# Patient Record
Sex: FEMALE | Race: WHITE | NOT HISPANIC OR LATINO | Employment: OTHER | URBAN - METROPOLITAN AREA
[De-identification: names, ages, dates, MRNs, and addresses within clinical notes are randomized per-mention and may not be internally consistent; named-entity substitution may affect disease eponyms.]

---

## 2019-05-31 ENCOUNTER — TELEPHONE (OUTPATIENT)
Dept: SCHEDULING | Facility: CLINIC | Age: 69
End: 2019-05-31

## 2019-05-31 DIAGNOSIS — E78.5 DYSLIPIDEMIA: Primary | ICD-10-CM

## 2019-12-03 ENCOUNTER — OFFICE VISIT (OUTPATIENT)
Dept: CARDIOLOGY | Facility: CLINIC | Age: 69
End: 2019-12-03
Payer: MEDICARE

## 2019-12-03 VITALS
WEIGHT: 136 LBS | DIASTOLIC BLOOD PRESSURE: 78 MMHG | SYSTOLIC BLOOD PRESSURE: 128 MMHG | HEIGHT: 63 IN | BODY MASS INDEX: 24.1 KG/M2

## 2019-12-03 DIAGNOSIS — E78.5 HYPERLIPIDEMIA, UNSPECIFIED HYPERLIPIDEMIA TYPE: Primary | ICD-10-CM

## 2019-12-03 PROCEDURE — 93000 ELECTROCARDIOGRAM COMPLETE: CPT | Performed by: INTERNAL MEDICINE

## 2019-12-03 PROCEDURE — 99214 OFFICE O/P EST MOD 30 MIN: CPT | Performed by: INTERNAL MEDICINE

## 2019-12-03 RX ORDER — CALCIUM CARBONATE 500(1250)
TABLET ORAL
COMMUNITY
Start: 2016-08-29

## 2019-12-03 RX ORDER — CICLOPIROX 80 MG/ML
SOLUTION TOPICAL
COMMUNITY
Start: 2011-12-13

## 2019-12-03 RX ORDER — DESOXIMETASONE 2.5 MG/G
CREAM TOPICAL
COMMUNITY
Start: 2008-10-30

## 2019-12-03 RX ORDER — SUMATRIPTAN SUCCINATE 100 MG/1
TABLET ORAL
COMMUNITY

## 2019-12-03 RX ORDER — IBUPROFEN 600 MG/1
TABLET ORAL
COMMUNITY
Start: 2016-12-13

## 2019-12-03 RX ORDER — ACETAMINOPHEN 500 MG
TABLET ORAL
COMMUNITY
Start: 2016-08-29

## 2019-12-03 RX ORDER — VITAMIN B COMPLEX
1 CAPSULE ORAL DAILY
COMMUNITY

## 2019-12-03 NOTE — LETTER
December 3, 2019     Chelsie Bartlett MD  [address] [address]    Patient: Brandee Raymundo  YOB: 1950  Date of Visit: 12/3/2019      Dear Dr. Bartlett:    Thank you for referring Brandee Raymundo to me for evaluation. Below are my notes for this consultation.    If you have questions, please do not hesitate to call me. I look forward to following your patient along with you.         Sincerely,        Carlos Enrique Dow MD        CC: No Recipients  Carlos Enrique Dow MD  12/3/2019  4:14 PM  Sign at close encounter    12/3/2019    Chelsie Bartlett M.D.    BRANDEE RAYMUNDO  : 1950    Dear Dr. Bartlett:    It was my pleasure to see your patient, Brandee Raymundo, in [facility]. As you know, she carries a history of dyslipidemia characterized as an elevated HDL cholesterol.      We reviewed her previous cardiovascular workup. As you know, she underwent a coronary calcium score in . Her calcium score was 0. There is no family history of premature coronary artery disease. She denies smoking, hypertension or obesity. She follows a low-carbohydrate, low-saturated fat Mediterranean Diet.    We reviewed her advanced lipid panel in detail. Her total cholesterol is 220, , HDL 88, triglycerides 57. Her LP(a) is borderline at 121. 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 Fortunately, her homocystine levels are normal. There is no evidence of insulin resistance or prediabetes and her omega-3 index is excellent at 8.2.    Today we reviewed her most recent lipid panel.  Total cholesterol 241   triglycerides 61.  Certainly an unremarkable lipid panel on lifestyle management.  We are recommending that she undergo repeat coronary calcium score.  If her calcium score remains 0 she will continue with lifestyle management.    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  ·   PAST SURGICAL HISTORY:   Rotator cuff, bunion, appendectomy.    CURRENT MEDICATIONS:   Current Outpatient Medications:   •  ALPHA LIPOIC ACID ORAL, Take by mouth daily., Disp: , Rfl:   •  b complex vitamins capsule, Take 1 capsule by mouth daily., Disp: , Rfl:   •  calcium carbonate (CALCIUM 500) 500 mg calcium (1,250 mg) tablet, take 4 tablet by oral route  every day, Disp: , Rfl:   •  cholecalciferol, vitamin D3, (VITAMIN D3) 5,000 unit (125 mcg) tablet, take 1 capsule by oral route  every day, Disp: , Rfl:   •  COQ10, UBIQUINOL, ORAL, Take by mouth., Disp: , Rfl:   •  Lactobac no.41-Bifidobact no.7 (PROBIOTIC-10) 70 mg (3 billion cell) capsule, One capsule daily, Disp: , Rfl:   •  SUMAtriptan (IMITREX) 100 mg tablet, Take by mouth., Disp: , Rfl:   •  ciclopirox (PENLAC) 8 % solution, Apply topically., Disp: , Rfl:   •  desoximetasone (TOPICORT) 0.25 % cream, Apply topically., Disp: , Rfl:   •  ibuprofen (MOTRIN) 600 mg tablet, TK 1 T PO TID, Disp: , Rfl:   •  omega 3-dha-epa-fish oil (FISH OIL) capsule, 1200mg Two capsule daily, Disp: , Rfl:       SUPPLEMENTS:   Curcumin, omega-3 fish oil, Reservitol.    ALLERGIES  No known drug allergies.    FAMILY HISTORY:   Mother had bladder cancer.   Father  of congestive heart failure.    sister -  mitral valve prolapse.    SOCIAL HISTORY:   She is single. She has no children. She is a , working [employer]. She does not smoke. Occasional alcohol. Active lifestyle.    REVIEW OF SYSTEMS: The patient denies any chest pain, shortness of breath or palpitations. The remainder of her review of systems is unremarkable.    PHYSICAL  EXAMINATION: The patient is a middle-aged white female in no acute distress.  Weight: 136.  Blood pressure: 128/78.  Heart rate: 60 and regular.  Respiratory rate: 12.  Temperature: Afebrile.  HEENT: Unremarkable.  Neck: Supple, no JVD.  Lungs: Clear to auscultation and percussion.  Cardiac: Regular rate and rhythm.  Abdomen: Soft, bowel sounds present, no organomegaly.  Extremities: No edema, pulses intact.  Skin: Warm and dry.  Neuro: Alert and oriented X 3.    LABORATORY DATA:    EKG: normal sinus rhythm, short DC interval     Lipid panel: November 2019:  total cholesterol of 220, , HDL 88, triglycerides 57.     Coronary calcium score 2016:  0.  Repeat study pending    IMPRESSIONS/RECOMMENDATIONS:  1. Cardiovascular risk assessment. The patient's calcium score is 0 which places her in a low risk quartile. Continue lifestyle management.  She will undergo repeat coronary calcium score.  2. Dyslipidemia. Continue low-carbohydrate, low-saturated fat Mediterranean Diet and a walking exercise program.  3. Lumbosacral DJD.  4 Migraine headaches.      Summary: We will see Brandee back in the office in the spring 2020.  She will undergo repeat coronary calcium score and lipid panel.  Will make further recommendations on lipid management at that time.    Sincerely,  Carlos Enrique Dow MD  12/3/2019

## 2019-12-03 NOTE — PROGRESS NOTES
12/3/2019    Chelsie Bartlett M.D.    BRANDEE PARIS  : 1950    Dear Dr. Bartlett:    It was my pleasure to see your patient, Brandee Paris, in the [facility]. As you know, she carries a history of dyslipidemia characterized as an elevated HDL cholesterol.      We reviewed her previous cardiovascular workup. As you know, she underwent a coronary calcium score in 2016. Her calcium score was 0. There is no family history of premature coronary artery disease. She denies smoking, hypertension or obesity. She follows a low-carbohydrate, low-saturated fat Mediterranean Diet.    We reviewed her advanced lipid panel in detail. Her total cholesterol is 220, , HDL 88, triglycerides 57. Her LP(a) is borderline at 121. 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 Fortunately, her homocystine levels are normal. There is no evidence of insulin resistance or prediabetes and her omega-3 index is excellent at 8.2.    Today we reviewed her most recent lipid panel.  Total cholesterol 241   triglycerides 61.  Certainly an unremarkable lipid panel on lifestyle management.  We are recommending that she undergo repeat coronary calcium score.  If her calcium score remains 0 she will continue with lifestyle management.    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  ·   PAST SURGICAL HISTORY:   Rotator cuff, bunion, appendectomy.    CURRENT MEDICATIONS:   Current Outpatient Medications:   •  ALPHA LIPOIC ACID ORAL, Take by mouth daily., Disp: , Rfl:   •  b complex vitamins capsule, Take 1 capsule by mouth daily.,  Disp: , Rfl:   •  calcium carbonate (CALCIUM 500) 500 mg calcium (1,250 mg) tablet, take 4 tablet by oral route  every day, Disp: , Rfl:   •  cholecalciferol, vitamin D3, (VITAMIN D3) 5,000 unit (125 mcg) tablet, take 1 capsule by oral route  every day, Disp: , Rfl:   •  COQ10, UBIQUINOL, ORAL, Take by mouth., Disp: , Rfl:   •  Lactobac no.41-Bifidobact no.7 (PROBIOTIC-10) 70 mg (3 billion cell) capsule, One capsule daily, Disp: , Rfl:   •  SUMAtriptan (IMITREX) 100 mg tablet, Take by mouth., Disp: , Rfl:   •  ciclopirox (PENLAC) 8 % solution, Apply topically., Disp: , Rfl:   •  desoximetasone (TOPICORT) 0.25 % cream, Apply topically., Disp: , Rfl:   •  ibuprofen (MOTRIN) 600 mg tablet, TK 1 T PO TID, Disp: , Rfl:   •  omega 3-dha-epa-fish oil (FISH OIL) capsule, 1200mg Two capsule daily, Disp: , Rfl:       SUPPLEMENTS:   Curcumin, omega-3 fish oil, Reservitol.    ALLERGIES  No known drug allergies.    FAMILY HISTORY:   Mother had bladder cancer.   Father  of congestive heart failure.    sister -  mitral valve prolapse.    SOCIAL HISTORY:   She is single. She has no children. She is a , working [employer]. She does not smoke. Occasional alcohol. Active lifestyle.    REVIEW OF SYSTEMS: The patient denies any chest pain, shortness of breath or palpitations. The remainder of her review of systems is unremarkable.    PHYSICAL EXAMINATION: The patient is a middle-aged white female in no acute distress.  Weight: 136.  Blood pressure: 128/78.  Heart rate: 60 and regular.  Respiratory rate: 12.  Temperature: Afebrile.  HEENT: Unremarkable.  Neck: Supple, no JVD.  Lungs: Clear to auscultation and percussion.  Cardiac: Regular rate and rhythm.  Abdomen: Soft, bowel sounds present, no organomegaly.  Extremities: No edema, pulses intact.  Skin: Warm and dry.  Neuro: Alert and oriented X 3.    LABORATORY DATA:    EKG: normal sinus rhythm, short ND interval     Lipid panel: 2019:  total  cholesterol of 220, , HDL 88, triglycerides 57.     Coronary calcium score 2016:  0.  Repeat study pending    IMPRESSIONS/RECOMMENDATIONS:  1. Cardiovascular risk assessment. The patient's calcium score is 0 which places her in a low risk quartile. Continue lifestyle management.  She will undergo repeat coronary calcium score.  2. Dyslipidemia. Continue low-carbohydrate, low-saturated fat Mediterranean Diet and a walking exercise program.  3. Lumbosacral DJD.  4 Migraine headaches.      Summary: We will see Brandee back in the office in the spring 2020.  She will undergo repeat coronary calcium score and lipid panel.  Will make further recommendations on lipid management at that time.    Sincerely,  Carlos Enrique Dow MD  12/3/2019

## 2019-12-10 ENCOUNTER — TELEPHONE (OUTPATIENT)
Dept: SCHEDULING | Facility: CLINIC | Age: 69
End: 2019-12-10

## 2019-12-10 NOTE — TELEPHONE ENCOUNTER
[facility] called to request her EKG and most recently lab work be mailed to her homer.   She is having a cosmetic procedure not necessarily surgery and they requested this. She also denies that they were requesting a cardiac clearance.     Please mail EKG dated 12/2019 and Labwrk form [facility] dated 11/2019

## 2020-04-17 ENCOUNTER — TELEPHONE (OUTPATIENT)
Dept: SCHEDULING | Facility: CLINIC | Age: 70
End: 2020-04-17

## 2020-04-17 NOTE — TELEPHONE ENCOUNTER
Pt called requesting to Re-schedule her appt to August.   Pt would not state the reason for the re-schedule and there were no appts. To offer.   Pt states anytime within August on either a Tuesday or Wednesday would work best for her.   Pt requested I keep appt. for 6/2/2020 scheduled until another appt becomes available.     Pt can be reached @ [phone].     ME

## 2020-06-09 ENCOUNTER — APPOINTMENT (OUTPATIENT)
Dept: LAB | Facility: HOSPITAL | Age: 70
End: 2020-06-09
Attending: INTERNAL MEDICINE
Payer: MEDICARE

## 2020-06-09 ENCOUNTER — HOSPITAL ENCOUNTER (OUTPATIENT)
Dept: RADIOLOGY | Facility: HOSPITAL | Age: 70
Discharge: HOME | End: 2020-06-09
Attending: INTERNAL MEDICINE
Payer: MEDICARE

## 2020-06-09 DIAGNOSIS — E78.5 HYPERLIPIDEMIA, UNSPECIFIED HYPERLIPIDEMIA TYPE: ICD-10-CM

## 2020-06-09 LAB
ALBUMIN SERPL-MCNC: 4.1 G/DL (ref 3.4–5)
ALP SERPL-CCNC: 127 IU/L (ref 35–126)
ALT SERPL-CCNC: 20 IU/L (ref 11–54)
ANION GAP SERPL CALC-SCNC: 9 MEQ/L (ref 3–15)
AST SERPL-CCNC: 20 IU/L (ref 15–41)
BILIRUB SERPL-MCNC: 0.6 MG/DL (ref 0.3–1.2)
BUN SERPL-MCNC: 16 MG/DL (ref 8–20)
CALCIUM SERPL-MCNC: 9.6 MG/DL (ref 8.9–10.3)
CHLORIDE SERPL-SCNC: 106 MEQ/L (ref 98–109)
CHOLEST SERPL-MCNC: 232 MG/DL
CO2 SERPL-SCNC: 28 MEQ/L (ref 22–32)
CREAT SERPL-MCNC: 0.6 MG/DL (ref 0.6–1.1)
GFR SERPL CREATININE-BSD FRML MDRD: >60 ML/MIN/1.73M*2
GLUCOSE SERPL-MCNC: 98 MG/DL (ref 70–99)
HDLC SERPL-MCNC: 93 MG/DL
HDLC SERPL: 2.5 {RATIO}
LDLC SERPL CALC-MCNC: 132 MG/DL
NONHDLC SERPL-MCNC: 139 MG/DL
POTASSIUM SERPL-SCNC: 4.3 MEQ/L (ref 3.6–5.1)
PROT SERPL-MCNC: 7 G/DL (ref 6–8.2)
SODIUM SERPL-SCNC: 143 MEQ/L (ref 136–144)
TRIGL SERPL-MCNC: 37 MG/DL (ref 30–149)

## 2020-06-09 PROCEDURE — 36415 COLL VENOUS BLD VENIPUNCTURE: CPT

## 2020-06-09 PROCEDURE — 80061 LIPID PANEL: CPT

## 2020-06-09 PROCEDURE — 75571 CT HRT W/O DYE W/CA TEST: CPT

## 2020-06-09 PROCEDURE — 80053 COMPREHEN METABOLIC PANEL: CPT

## 2020-07-30 ENCOUNTER — TELEPHONE (OUTPATIENT)
Dept: CARDIOLOGY | Facility: CLINIC | Age: 70
End: 2020-07-30

## 2020-07-30 NOTE — TELEPHONE ENCOUNTER
Spoke with patient to reschedule appt to [facility] location/ pt did not want to reschedule. I tried to schedule her other locations, but she will call back to reschedule.

## 2020-08-19 ENCOUNTER — TELEPHONE (OUTPATIENT)
Dept: SCHEDULING | Facility: CLINIC | Age: 70
End: 2020-08-19

## 2020-08-19 NOTE — TELEPHONE ENCOUNTER
S/w pt to offer her an appt with Nadiya but pt stated that she will wait until January to see Dr Dow.

## 2020-08-19 NOTE — TELEPHONE ENCOUNTER
S/w pt to sched an appt no available appts until January pt stated that she will wait until January.

## 2020-08-19 NOTE — TELEPHONE ENCOUNTER
Pt is trying to schedule an appt with Dr. Dow at the [facility] location. Pt is open to any appt date and time. Pt is trying to see the dr within the next 4-6 weeks. Pt can be reached [phone]

## 2021-03-17 ENCOUNTER — DOCTOR'S OFFICE (OUTPATIENT)
Dept: URBAN - METROPOLITAN AREA CLINIC 163 | Facility: CLINIC | Age: 71
Setting detail: OPHTHALMOLOGY
End: 2021-03-17
Payer: MEDICARE

## 2021-03-17 PROCEDURE — 92012 INTRM OPH EXAM EST PATIENT: CPT | Performed by: OPHTHALMOLOGY

## 2021-03-17 PROCEDURE — 68761 CLOSE TEAR DUCT OPENING: CPT | Performed by: OPHTHALMOLOGY

## 2021-09-24 ENCOUNTER — RX ONLY (RX ONLY)
Age: 71
End: 2021-09-24

## 2021-09-29 ENCOUNTER — DOCTOR'S OFFICE (OUTPATIENT)
Dept: URBAN - METROPOLITAN AREA CLINIC 163 | Facility: CLINIC | Age: 71
Setting detail: OPHTHALMOLOGY
End: 2021-09-29
Payer: MEDICARE

## 2021-09-29 PROCEDURE — 92014 COMPRE OPH EXAM EST PT 1/>: CPT | Performed by: OPTOMETRIST

## 2021-09-29 ASSESSMENT — VISUAL ACUITY: OD_BCVA: 20/25-2

## 2021-09-29 ASSESSMENT — CONFRONTATIONAL VISUAL FIELD TEST (CVF)
OD_FINDINGS: FULL
OS_FINDINGS: FULL

## 2021-09-29 ASSESSMENT — TONOMETRY
OD_IOP_MMHG: 12
OS_IOP_MMHG: 11

## 2022-03-24 ENCOUNTER — DOCTOR'S OFFICE (OUTPATIENT)
Dept: URBAN - METROPOLITAN AREA CLINIC 163 | Facility: CLINIC | Age: 72
Setting detail: OPHTHALMOLOGY
End: 2022-03-24
Payer: MEDICARE

## 2022-03-24 PROCEDURE — 66821 AFTER CATARACT LASER SURGERY: CPT | Performed by: OPHTHALMOLOGY

## 2022-03-24 ASSESSMENT — VISUAL ACUITY: OD_BCVA: 20/25-2

## 2022-04-20 ENCOUNTER — DOCTOR'S OFFICE (OUTPATIENT)
Dept: URBAN - METROPOLITAN AREA CLINIC 163 | Facility: CLINIC | Age: 72
Setting detail: OPHTHALMOLOGY
End: 2022-04-20
Payer: MEDICARE

## 2022-04-20 PROCEDURE — 92014 COMPRE OPH EXAM EST PT 1/>: CPT | Performed by: OPTOMETRIST

## 2022-04-20 ASSESSMENT — CONFRONTATIONAL VISUAL FIELD TEST (CVF)
OS_FINDINGS: FULL
OD_FINDINGS: FULL

## 2022-04-20 ASSESSMENT — SUPERFICIAL PUNCTATE KERATITIS (SPK)
OD_SPK: 1+
OS_SPK: 1+

## 2022-04-20 ASSESSMENT — TONOMETRY
OD_IOP_MMHG: 13
OS_IOP_MMHG: 13

## 2022-04-23 ASSESSMENT — VISUAL ACUITY
OS_BCVA: 20/20-2
OD_BCVA: 20/20-2

## 2022-08-26 ENCOUNTER — DOCTOR'S OFFICE (OUTPATIENT)
Dept: URBAN - METROPOLITAN AREA CLINIC 163 | Facility: CLINIC | Age: 72
Setting detail: OPHTHALMOLOGY
End: 2022-08-26
Payer: MEDICARE

## 2022-08-26 PROCEDURE — 92012 INTRM OPH EXAM EST PATIENT: CPT | Performed by: OPTOMETRIST

## 2022-08-26 ASSESSMENT — VISUAL ACUITY
OS_BCVA: 20/50
OD_BCVA: 20/30-1

## 2022-08-26 ASSESSMENT — REFRACTION_CURRENTRX
OS_AXIS: 127
OS_CYLINDER: +1.00
OD_CYLINDER: +1.00
OD_VPRISM_DIRECTION: PROGS
OS_ADD: +2.50
OS_SPHERE: -2.25
OD_OVR_VA: 20/
OD_SPHERE: -2.25
OD_ADD: +2.50
OD_AXIS: 035
OS_VPRISM_DIRECTION: PROGS
OS_OVR_VA: 20/

## 2022-08-26 ASSESSMENT — REFRACTION_AUTOREFRACTION
OD_SPHERE: -2.50
OS_AXIS: 127
OD_CYLINDER: +1.50
OD_AXIS: 048
OS_CYLINDER: +0.75
OS_SPHERE: -2.00

## 2022-08-26 ASSESSMENT — SPHEQUIV_DERIVED
OS_SPHEQUIV: -1.625
OD_SPHEQUIV: -1.75

## 2022-08-26 ASSESSMENT — CONFRONTATIONAL VISUAL FIELD TEST (CVF)
OD_FINDINGS: FULL
OS_FINDINGS: FULL

## 2022-08-26 ASSESSMENT — SUPERFICIAL PUNCTATE KERATITIS (SPK)
OS_SPK: 1+
OD_SPK: 1+

## 2022-11-10 ENCOUNTER — DOCTOR'S OFFICE (OUTPATIENT)
Dept: URBAN - METROPOLITAN AREA CLINIC 163 | Facility: CLINIC | Age: 72
Setting detail: OPHTHALMOLOGY
End: 2022-11-10
Payer: MEDICARE

## 2022-11-10 DIAGNOSIS — H00.12: ICD-10-CM

## 2022-11-10 DIAGNOSIS — D31.32: ICD-10-CM

## 2022-11-10 DIAGNOSIS — H04.123: ICD-10-CM

## 2022-11-10 DIAGNOSIS — H02.431: ICD-10-CM

## 2022-11-10 DIAGNOSIS — H25.13: ICD-10-CM

## 2022-11-10 PROCEDURE — 92014 COMPRE OPH EXAM EST PT 1/>: CPT | Performed by: OPHTHALMOLOGY

## 2022-11-10 PROCEDURE — 92250 FUNDUS PHOTOGRAPHY W/I&R: CPT | Performed by: OPHTHALMOLOGY

## 2022-11-10 ASSESSMENT — REFRACTION_CURRENTRX
OS_CYLINDER: +1.00
OS_SPHERE: -2.25
OD_CYLINDER: +1.00
OS_AXIS: 127
OD_AXIS: 035
OD_SPHERE: -2.25
OD_OVR_VA: 20/
OS_VPRISM_DIRECTION: PROGS
OS_OVR_VA: 20/
OD_VPRISM_DIRECTION: PROGS
OD_ADD: +2.50
OS_ADD: +2.50

## 2022-11-10 ASSESSMENT — SUPERFICIAL PUNCTATE KERATITIS (SPK)
OS_SPK: 1+
OD_SPK: 1+

## 2022-11-10 ASSESSMENT — CONFRONTATIONAL VISUAL FIELD TEST (CVF)
OS_FINDINGS: FULL
OD_FINDINGS: FULL

## 2022-11-10 ASSESSMENT — SPHEQUIV_DERIVED
OS_SPHEQUIV: -1.375
OD_SPHEQUIV: -1.875

## 2022-11-10 ASSESSMENT — REFRACTION_AUTOREFRACTION
OS_CYLINDER: +0.75
OS_SPHERE: -1.75
OD_AXIS: 047
OD_SPHERE: -2.75
OD_CYLINDER: +1.75
OS_AXIS: 133

## 2022-11-10 ASSESSMENT — VISUAL ACUITY
OD_BCVA: 20/25-
OS_BCVA: 20/70-2

## 2023-05-10 ENCOUNTER — DOCTOR'S OFFICE (OUTPATIENT)
Dept: URBAN - METROPOLITAN AREA CLINIC 163 | Facility: CLINIC | Age: 73
Setting detail: OPHTHALMOLOGY
End: 2023-05-10
Payer: MEDICARE

## 2023-05-10 DIAGNOSIS — H02.431: ICD-10-CM

## 2023-05-10 DIAGNOSIS — H04.123: ICD-10-CM

## 2023-05-10 DIAGNOSIS — D31.32: ICD-10-CM

## 2023-05-10 DIAGNOSIS — H25.13: ICD-10-CM

## 2023-05-10 PROCEDURE — 92012 INTRM OPH EXAM EST PATIENT: CPT | Performed by: OPTOMETRIST

## 2023-05-10 ASSESSMENT — REFRACTION_CURRENTRX
OD_ADD: +2.25
OD_CYLINDER: +1.00
OD_VPRISM_DIRECTION: PROGS
OS_OVR_VA: 20/
OS_AXIS: 126
OD_SPHERE: -2.25
OD_AXIS: 037
OS_VPRISM_DIRECTION: PROGS
OD_OVR_VA: 20/
OS_SPHERE: -2.25
OS_ADD: +2.50
OS_CYLINDER: +1.00

## 2023-05-10 ASSESSMENT — REFRACTION_AUTOREFRACTION
OD_CYLINDER: +1.50
OD_AXIS: 053
OD_SPHERE: -2.75
OS_SPHERE: -2.50
OS_CYLINDER: +1.00
OS_AXIS: 125

## 2023-05-10 ASSESSMENT — REFRACTION_MANIFEST
OD_VA1: 20/20
OD_SPHERE: -2.50
OS_AXIS: 135
OS_SPHERE: -2.25
OS_CYLINDER: +1.00
OD_CYLINDER: +1.25
OD_ADD: +2.50
OD_AXIS: 055
OS_VA1: 20/25
OS_ADD: +2.50

## 2023-05-10 ASSESSMENT — CONFRONTATIONAL VISUAL FIELD TEST (CVF)
OS_FINDINGS: FULL
OD_FINDINGS: FULL

## 2023-05-10 ASSESSMENT — VISUAL ACUITY
OS_BCVA: 20/20-2
OD_BCVA: 20/25-2

## 2023-05-10 ASSESSMENT — SUPERFICIAL PUNCTATE KERATITIS (SPK)
OD_SPK: 1+
OS_SPK: 1+

## 2023-05-10 ASSESSMENT — SPHEQUIV_DERIVED
OS_SPHEQUIV: -2
OD_SPHEQUIV: -1.875
OD_SPHEQUIV: -2
OS_SPHEQUIV: -1.75

## 2024-03-04 ENCOUNTER — TELEPHONE (OUTPATIENT)
Dept: SCHEDULING | Facility: CLINIC | Age: 74
End: 2024-03-04
Payer: MEDICARE

## 2024-03-04 NOTE — TELEPHONE ENCOUNTER
New Patient Appointment Request    Name of caller: Brandee Paris     Reason for Visit: Hyperlipidemia    Insurance: [facility]    Insurance ID #: [id number]    Recent Cardiac Test/Procedures: CAC 6/9/2020    Referred by: self (previous 2019) pt    Primary Care Physician: Chelsie Bartlett MD     Previous Cardiologist name and phone number: Dr. Dow     Best contact number: [phone]

## 2024-03-07 NOTE — TELEPHONE ENCOUNTER
Left pt a v/m to schedule appt with Nadiya Arana for access center to put her call thru to [facility] office

## 2024-05-01 ENCOUNTER — DOCTOR'S OFFICE (OUTPATIENT)
Dept: URBAN - METROPOLITAN AREA CLINIC 163 | Facility: CLINIC | Age: 74
Setting detail: OPHTHALMOLOGY
End: 2024-05-01
Payer: MEDICARE

## 2024-05-01 DIAGNOSIS — H25.13: ICD-10-CM

## 2024-05-01 DIAGNOSIS — H02.431: ICD-10-CM

## 2024-05-01 DIAGNOSIS — D31.32: ICD-10-CM

## 2024-05-01 DIAGNOSIS — H04.123: ICD-10-CM

## 2024-05-01 PROCEDURE — 92250 FUNDUS PHOTOGRAPHY W/I&R: CPT | Performed by: OPTOMETRIST

## 2024-05-01 PROCEDURE — 92014 COMPRE OPH EXAM EST PT 1/>: CPT | Performed by: OPTOMETRIST

## 2024-05-01 ASSESSMENT — CONFRONTATIONAL VISUAL FIELD TEST (CVF)
OD_FINDINGS: FULL
OS_FINDINGS: FULL

## 2025-04-16 ENCOUNTER — DOCTOR'S OFFICE (OUTPATIENT)
Dept: URBAN - METROPOLITAN AREA CLINIC 163 | Facility: CLINIC | Age: 75
Setting detail: OPHTHALMOLOGY
End: 2025-04-16
Payer: MEDICARE

## 2025-04-16 DIAGNOSIS — H04.123: ICD-10-CM

## 2025-04-16 DIAGNOSIS — H02.431: ICD-10-CM

## 2025-04-16 DIAGNOSIS — H25.13: ICD-10-CM

## 2025-04-16 PROCEDURE — 92014 COMPRE OPH EXAM EST PT 1/>: CPT | Performed by: OPTOMETRIST

## 2025-04-16 ASSESSMENT — REFRACTION_CURRENTRX
OD_VPRISM_DIRECTION: PROGS
OD_CYLINDER: +1.00
OS_VPRISM_DIRECTION: PROGS
OD_OVR_VA: 20/
OD_ADD: +2.25
OS_AXIS: 126
OS_CYLINDER: +1.00
OS_OVR_VA: 20/
OD_SPHERE: -2.25
OS_SPHERE: -2.25
OS_ADD: +2.50
OD_AXIS: 037

## 2025-04-16 ASSESSMENT — REFRACTION_MANIFEST
OD_VA1: 20/20
OD_AXIS: 055
OS_CYLINDER: +0.75
OD_ADD: +2.50
OD_AXIS: 050
OS_AXIS: 135
OD_CYLINDER: +1.25
OS_CYLINDER: +0.75
OD_SPHERE: -2.25
OS_VA1: 20/25
OS_ADD: +2.50
OD_VA1: 20/30-
OD_ADD: +2.50
OD_VA1: 20/20-2
OS_AXIS: 135
OS_VA1: 20/30+
OS_CYLINDER: +1.00
OD_SPHERE: -2.50
OD_CYLINDER: +0.75
OD_SPHERE: -2.75
OS_SPHERE: -2.25
OD_AXIS: 060
OS_AXIS: 130
OS_VA1: 20/30
OS_ADD: +2.50
OD_CYLINDER: +1.00
OU_VA: 20/20
OS_SPHERE: -2.25
OS_SPHERE: -2.25

## 2025-04-16 ASSESSMENT — CONFRONTATIONAL VISUAL FIELD TEST (CVF)
OS_FINDINGS: FULL
OD_FINDINGS: FULL

## 2025-04-16 ASSESSMENT — REFRACTION_AUTOREFRACTION
OD_AXIS: 136
OS_AXIS: 035
OS_CYLINDER: -0.75
OD_CYLINDER: -1.75
OD_SPHERE: -1.25
OS_SPHERE: -1.50

## 2025-04-16 ASSESSMENT — SUPERFICIAL PUNCTATE KERATITIS (SPK)
OS_SPK: 1+
OD_SPK: 1+

## 2025-04-16 ASSESSMENT — VISUAL ACUITY
OS_BCVA: 20/30
OD_BCVA: 20/25-2